# Patient Record
Sex: MALE | URBAN - METROPOLITAN AREA
[De-identification: names, ages, dates, MRNs, and addresses within clinical notes are randomized per-mention and may not be internally consistent; named-entity substitution may affect disease eponyms.]

---

## 2022-05-27 ENCOUNTER — APPOINTMENT (RX ONLY)
Dept: URBAN - METROPOLITAN AREA CLINIC 134 | Facility: CLINIC | Age: 72
Setting detail: DERMATOLOGY
End: 2022-05-27

## 2022-05-27 DIAGNOSIS — L85.3 XEROSIS CUTIS: ICD-10-CM

## 2022-05-27 DIAGNOSIS — L30.9 DERMATITIS, UNSPECIFIED: ICD-10-CM

## 2022-05-27 PROCEDURE — ? PRESCRIPTION

## 2022-05-27 PROCEDURE — ? TREATMENT REGIMEN

## 2022-05-27 PROCEDURE — 99203 OFFICE O/P NEW LOW 30 MIN: CPT

## 2022-05-27 PROCEDURE — ? COUNSELING

## 2022-05-27 RX ORDER — TRIAMCINOLONE ACETONIDE 1 MG/G
CREAM TOPICAL
Qty: 80 | Refills: 0 | Status: ERX | COMMUNITY
Start: 2022-05-27

## 2022-05-27 RX ADMIN — TRIAMCINOLONE ACETONIDE: 1 CREAM TOPICAL at 00:00

## 2022-05-27 ASSESSMENT — LOCATION SIMPLE DESCRIPTION DERM
LOCATION SIMPLE: RIGHT FOREARM
LOCATION SIMPLE: RIGHT UPPER BACK
LOCATION SIMPLE: LEFT FOREARM

## 2022-05-27 ASSESSMENT — LOCATION DETAILED DESCRIPTION DERM
LOCATION DETAILED: RIGHT SUPERIOR MEDIAL UPPER BACK
LOCATION DETAILED: RIGHT VENTRAL PROXIMAL FOREARM
LOCATION DETAILED: LEFT VENTRAL PROXIMAL FOREARM

## 2022-05-27 ASSESSMENT — LOCATION ZONE DERM
LOCATION ZONE: ARM
LOCATION ZONE: TRUNK

## 2022-05-27 NOTE — PROCEDURE: COUNSELING
Detail Level: Zone
Patient Specific Counseling (Will Not Stick From Patient To Patient): Possibly due to an allergy to Ancef. He had a dose of that antibiotic around 4/18 and had a horrible rash. Today on exam he has some slightly erythematous, itchy skin on his ventral arms. I am not sure why it flared up again recently. He can continue his Tylenol for pain and his current prescriptions as I do not think this is the cause of the flare up. I explained to the patient and his wife there is not any genetic testing to show what all he is allergic to.

## 2022-05-27 NOTE — PROCEDURE: TREATMENT REGIMEN
Discontinue Regimen: Benadryl- to reduce risk of falling
Detail Level: Zone
Initiate Treatment: Limit soap to odorous areas only- warm water in the shower only \\nMoisturize wet/damp skin with Curel lotion\\nApply Triamcinolone 0.1% cream twice daily\\nTake an Allegra daily
Continue Regimen: Fragrance free laundry detergent \\nDove soap in the shower
Initiate Treatment: Buff the dead skin off his back with his towel after showering\\nApply Curel moisturizer

## 2022-05-27 NOTE — HPI: RASH
What Type Of Note Output Would You Prefer (Optional)?: Standard Output
How Severe Is Your Rash?: mild
Is This A New Presentation, Or A Follow-Up?: Rash
Additional History: Patient had his knees replaced starting in 2/22, he was given an post op antibiotic (Cefazolin)both times and had a rash from it. The rash has pretty much resolved now. There is some recurrence on his arms today.\\n